# Patient Record
Sex: FEMALE | Race: WHITE | NOT HISPANIC OR LATINO | URBAN - METROPOLITAN AREA
[De-identification: names, ages, dates, MRNs, and addresses within clinical notes are randomized per-mention and may not be internally consistent; named-entity substitution may affect disease eponyms.]

---

## 2019-06-06 ENCOUNTER — OFFICE VISIT - HEALTHEAST (OUTPATIENT)
Dept: FAMILY MEDICINE | Facility: CLINIC | Age: 74
End: 2019-06-06

## 2019-06-06 DIAGNOSIS — I87.2 VENOUS INSUFFICIENCY OF BOTH LOWER EXTREMITIES: ICD-10-CM

## 2019-06-06 DIAGNOSIS — T14.8XXA LOCAL INFECTION OF WOUND: ICD-10-CM

## 2019-06-06 DIAGNOSIS — L08.9 LOCAL INFECTION OF WOUND: ICD-10-CM

## 2019-06-06 RX ORDER — SIMVASTATIN 10 MG
10 TABLET ORAL AT BEDTIME
Status: SHIPPED | COMMUNITY
Start: 2019-06-06

## 2021-05-29 NOTE — PROGRESS NOTES
Assessment:     1. Venous insufficiency of both lower extremities  cephalexin (KEFLEX) 500 MG capsule   2. Local infection of wound  cephalexin (KEFLEX) 500 MG capsule          Plan:     Differential diagnosis include but not limited to venous insufficiency, skin puncture, local wound infection.  On exam patient does have bilateral discoloration of her lower extremities.  As per her report that she was told sometimes she does have venous insufficiency and she had vein graft done does not remember exactly from the right or left side.  She does have a wound on the lateral side of the right lower extremity consistent with venous leg ulcer but the area is also erythematous and warm to touch there from afraid she might have an infection.  I will treat patient with Keflex 4 times daily x10 days.  She currently does not have PCP in [place] she lives in [place].  She will be in [place] for the rest of the summer.  She is aware that if her symptoms does not resolve or does not get better she need to make an appointment and follow-up with her primary care provider.  Patient also does have bilateral skin tears on her arms.  They are weeping, fluid.  Advised patient to keep the areas clean and dry.  Monitor for worsening symptoms.  I discussed red flag symptoms, return precautions to clinic/ER and follow up care with patient/parent.  Expected clinical course, symptomatic cares advised. Questions answered. Patient/parent amenable with plan.     Subjective:       74 y.o. female presents for evaluation of her wound.  Patient reports that he was walking about 3 weeks ago there was a stick sticking out of the porch and she walked into her and she had a skin puncture.  Since that the area has remained opened.  She noticed that the area is getting irritated, swelling, tender, and is not healing since she had the incident about 3 weeks ago.  She also noticed that she has bilateral skin tears to her arms.  Happened about 1 week ago.   The one on the left is dried up and crusting in the one on the right is weeping clear fluid.  Patient denies any other symptoms including nausea, vomiting, diarrhea, or shortness of breath.  She has not had any fever.  She admits that she has history of venous insufficiency and had vein graft.  She denies any other medical condition including diabetes, hypertension, or thyroid disease.  She admits to having elevated cholesterol and taking hyperlipidemia medications.    The following portions of the patient's history were reviewed and updated as appropriate: allergies, current medications, past family history, past medical history, past social history, past surgical history and problem list.  No past medical history on file.  No past surgical history on file.      Review of Systems  A 12 point comprehensive review of systems was negative except as noted.      Objective:      /79 (Patient Site: Right Arm, Patient Position: Sitting, Cuff Size: Adult Regular)   Pulse 79   Temp 97.8  F (36.6  C) (Oral)   Resp 16   Wt 162 lb (73.5 kg)   SpO2 95%   General appearance: alert, appears stated age, cooperative and mild distress  Head: Normocephalic, without obvious abnormality, atraumatic  Lungs: clear to auscultation bilaterally  Heart: regular rate and rhythm, S1, S2 normal, no murmur, click, rub or gallop  Extremities: edema +ulcer to the lateral site of the right leg and erythema and some swelling noted  Pulses: 2+ and symmetric  Skin: open area 1/4 inch in diameter, +erythema, +swelling, covered with honey crusted drainage consistent with venous leg ulcer.  She also have bilateral skin tear to her arms.  Left arm affected area clean, dry, and intact.  The right to the affected area looks fresh and weeping clear drainage.  Neurologic: Grossly normal     This note has been dictated using voice recognition software. Any grammatical or context distortions are unintentional and inherent to the software

## 2021-06-03 VITALS — WEIGHT: 162 LBS

## 2021-06-17 NOTE — PATIENT INSTRUCTIONS - HE
Patient Instructions by Radha Morley CNP at 6/6/2019  2:20 PM     Author: Radha Morley CNP Service: -- Author Type: Nurse Practitioner    Filed: 6/6/2019  2:43 PM Encounter Date: 6/6/2019 Status: Signed    : Radha Morley CNP (Nurse Practitioner)         Patient Education     Venous Leg Ulcer  Arteries carry oxygenated blood from your heart to the rest of your body. Veins return the blood to the heart.  When you sit or stand, blood in the veins in your legs must flow uphill to your heart. When you move your legs while walking, the contraction of your leg muscles has a pumping effect on the blood in the veins. The veins have one-way valves that stop the blood from flowing backward.  If you have poor blood flow in your veins (venous insufficiency), the one-way valves are damaged. The blood doesnt flow uphill very well. This raises pressure in the veins, and the tissues in your legs dont get enough oxygen. As the problem gets worse, an area of skin near the ankle turns dark and an ulcer forms. This is called a venous stasis ulcer. These ulcers usually dont hurt unless they become infected.  Venous stasis ulcers are treated with compression wraps, antibiotics that you put on your skin, and special dressings. Sometimes you may need a skin graft. Once the ulcer has healed, you will need to use compression stocking to help the pumping action in your veins. The stockings will also reduce swelling.  Home care  Follow these tips when caring for yourself at home:    Use any special compression dressings or stockings as directed.    If you were told to change your dressing, follow the instructions your healthcare provider gave you. Many different kinds of dressings can be used for this problem, and each is used differently.    Walk regularly. This helps the blood flow better in your legs.    Maintain a healthy weight. If you are overweight, talk with your provider about a weight loss  program.    If you smoke, quit smoking. This will ease your symptoms and lower the chance that the disease will get worse. Join a stop-smoking program or ask your provider for help in quitting.    Check your feet and legs for skin breaks or color changes. Report these to your provider. This could be an early sign of an ulcer.    Wear comfortable, well-fitting shoes and socks. Dont use socks that are tight. If they leave a dent in your leg by the end of the day, they are too tight.    When standing, shift your weight from one leg to the other.    When sitting for long periods, put your feet up. Move your feet and ankles often to get your calf muscles moving. Get up and walk from time to time.  Follow-up care  Follow up with your healthcare provider, or as advised.  Call 911  Call 911 if any of the following occur:    Shortness of breath or painful breathing    Chest pain    Coughing up blood  When to seek medical advice  Call your healthcare provider right away if any of these occur:    Pus or discharge coming from the ulcer    Pain in or around the ulcer    Redness or swelling of your leg around the ulcer    Fever of 100.4 F (38 C) or higher, or as directed by your healthcare provider    Repeated cough  Date Last Reviewed: 10/1/2016    7258-5948 [employer]. [address] [place] [address], [place] [address]. All rights reserved. This information is not intended as a substitute for professional medical care. Always follow your healthcare professional's instructions.           Patient Education     Abrasions  Abrasions are skin scrapes. Their treatment depends on how large and deep the abrasion is.  Home care  You may be prescribed an antibiotic cream or ointment to apply to the wound. This helps prevent infection. Follow instructions when using this medicine.  General care    To care for the abrasion, do the following each day for as long as directed by your healthcare provider.  ? If you were given a bandage,  change it once a day. If your bandage sticks to the wound, soak it in warm water until it loosens.  ? Wash the area with soap and warm water. You may do this in a sink or under a tub faucet or shower. Rinse off the soap. Then pat the area dry with a clean towel.  ? If antibiotic ointment or cream was prescribed, reapply it to the wound as directed. Cover the wound with a fresh nonstick bandage. If the bandage becomes wet or dirty, change it as soon as possible.  ? Some antibiotic ointments or cream can cause an allergic reaction or dermatitis. This may cause redness, itching and or hives. If this occurs, stop using the ointment immediately and wash off any remaining ointment. You may need to take some allergy medicine to relieve symptoms.    You may use acetaminophen or ibuprofen to control pain unless another pain medicine was prescribed. Talk with your healthcare provider before using these medicines if you have chronic liver or kidney disease or ever had a stomach ulcer or GI bleeding. Dont use ibuprofen in children younger than six months old.    Most skin wounds heal within 10 days. But an infection may occur even with treatment. So its important to watch the wound for signs of infection as listed below.  Follow-up care  Follow up with your healthcare provider, or as advised.  When to seek medical advice  Call your healthcare provider right away if any of these occur:    Fever of 100.4 F (38 C) or higher, or as directed by your healthcare provider    Increasing pain, redness, swelling, or drainage from the wound    Bleeding from the wound that does not stop after a few minutes of steady, firm pressure    Decreased ability to move any body part near the wound  Date Last Reviewed: 3/3/2017    5791-9123 [employer]. [address] [place] [address], [place] [address]. All rights reserved. This information is not intended as a substitute for professional medical care. Always follow your healthcare  professional's instructions.